# Patient Record
Sex: MALE | Race: WHITE | NOT HISPANIC OR LATINO | Employment: FULL TIME | URBAN - METROPOLITAN AREA
[De-identification: names, ages, dates, MRNs, and addresses within clinical notes are randomized per-mention and may not be internally consistent; named-entity substitution may affect disease eponyms.]

---

## 2024-06-27 ENCOUNTER — HOSPITAL ENCOUNTER (EMERGENCY)
Facility: HOSPITAL | Age: 54
Discharge: HOME | End: 2024-06-27
Attending: EMERGENCY MEDICINE
Payer: COMMERCIAL

## 2024-06-27 VITALS
DIASTOLIC BLOOD PRESSURE: 64 MMHG | RESPIRATION RATE: 18 BRPM | TEMPERATURE: 96.4 F | HEIGHT: 69 IN | HEART RATE: 68 BPM | BODY MASS INDEX: 27.4 KG/M2 | OXYGEN SATURATION: 99 % | SYSTOLIC BLOOD PRESSURE: 112 MMHG | WEIGHT: 185 LBS

## 2024-06-27 DIAGNOSIS — S61.412A LACERATION OF LEFT HAND WITHOUT FOREIGN BODY, INITIAL ENCOUNTER: Primary | ICD-10-CM

## 2024-06-27 PROCEDURE — 2500000005 HC RX 250 GENERAL PHARMACY W/O HCPCS: Performed by: EMERGENCY MEDICINE

## 2024-06-27 PROCEDURE — 12001 RPR S/N/AX/GEN/TRNK 2.5CM/<: CPT | Performed by: EMERGENCY MEDICINE

## 2024-06-27 PROCEDURE — 99283 EMERGENCY DEPT VISIT LOW MDM: CPT

## 2024-06-27 PROCEDURE — 99284 EMERGENCY DEPT VISIT MOD MDM: CPT | Performed by: EMERGENCY MEDICINE

## 2024-06-27 RX ORDER — CEPHALEXIN 500 MG/1
500 CAPSULE ORAL 3 TIMES DAILY
Qty: 21 CAPSULE | Refills: 0 | Status: SHIPPED | OUTPATIENT
Start: 2024-06-27 | End: 2024-07-04

## 2024-06-27 RX ORDER — LIDOCAINE HYDROCHLORIDE AND EPINEPHRINE 20; 10 MG/ML; UG/ML
5 INJECTION, SOLUTION INFILTRATION; PERINEURAL ONCE
Status: COMPLETED | OUTPATIENT
Start: 2024-06-27 | End: 2024-06-27

## 2024-06-27 RX ADMIN — LIDOCAINE HYDROCHLORIDE,EPINEPHRINE BITARTRATE 5 ML: 20; .01 INJECTION, SOLUTION INFILTRATION; PERINEURAL at 18:35

## 2024-06-27 ASSESSMENT — LIFESTYLE VARIABLES
TOTAL SCORE: 0
EVER HAD A DRINK FIRST THING IN THE MORNING TO STEADY YOUR NERVES TO GET RID OF A HANGOVER: NO
HAVE YOU EVER FELT YOU SHOULD CUT DOWN ON YOUR DRINKING: NO
HAVE PEOPLE ANNOYED YOU BY CRITICIZING YOUR DRINKING: NO
EVER FELT BAD OR GUILTY ABOUT YOUR DRINKING: NO

## 2024-06-27 ASSESSMENT — COLUMBIA-SUICIDE SEVERITY RATING SCALE - C-SSRS
2. HAVE YOU ACTUALLY HAD ANY THOUGHTS OF KILLING YOURSELF?: NO
6. HAVE YOU EVER DONE ANYTHING, STARTED TO DO ANYTHING, OR PREPARED TO DO ANYTHING TO END YOUR LIFE?: NO
1. IN THE PAST MONTH, HAVE YOU WISHED YOU WERE DEAD OR WISHED YOU COULD GO TO SLEEP AND NOT WAKE UP?: NO

## 2024-06-27 ASSESSMENT — PAIN - FUNCTIONAL ASSESSMENT
PAIN_FUNCTIONAL_ASSESSMENT: 0-10
PAIN_FUNCTIONAL_ASSESSMENT: 0-10

## 2024-06-27 ASSESSMENT — PAIN SCALES - GENERAL
PAINLEVEL_OUTOF10: 2
PAINLEVEL_OUTOF10: 6

## 2024-06-27 NOTE — DISCHARGE INSTRUCTIONS
Seek immediate medical attention if your wound develops: redness, swelling, warmth to touch, discharge or drainage, increasing pain, or any new or worsening symptoms.    Have your sutures removed in 10 to 12 days.

## 2024-06-27 NOTE — ED PROVIDER NOTES
HPI   Chief Complaint   Patient presents with    Hand Injury     Pt is on blood thinners; cut his L palm at work with a knife        This is a 54-year-old male who presents to ER with chief complaint of a laceration to his left thenar region.  He was using a razor to cut plastic and he excellently slipped and cut himself.  Tetanus was updated 2 years ago.  There is no intent for self-harm.  No other injuries or complaints.  No fevers or chills.  No chest pain or shortness of breath.  No nausea or vomiting or diarrhea.  No urinary issues.  No dizziness.  No headaches.    Past Medical History: Diabetes, DVT    Surgical History: No hand surgeries    Medications: See Nursing Notes, he is on Eliquis    Allergies: No drug allergies    Social History: No tobacco, no drugs, occasional    [facility]    Unless otherwise stated in this report the patient's positive and negative responses for review of systems for constitutional, eyes, ENT, cardiovascular, respiratory, gastrointestinal, neurological, genitourinary, musculoskeletal, and integument systems and related systems to the presenting problem are either as stated in the HPI or were not pertinent or were negative for the symptoms and/or complaints related to the presenting medical problem.      EXAM:  Vital signs reviewed  General:  Appears well  Alert  HEENT:  Head atraumatic  Eyes normal inspection  Normal ENT inspection  NECK:  Normal inspection  RESPIRATORY:  Normal breath sounds  No chest wall tenderness  No respiratory distress  CVS:  Heart rate and rhythm regular  No Murmurs  ABDOMEN/GI:  Soft  Non-tender  Bowel sounds normal  FEMALE GENITAL:  []  MALE GENITAL:  []  RECTAL:  []  BACK:  Normal inspection  EXTREMITIES:  Non-Tender  Full ROM of all digits.  Full range of motion of all digits against resistance.  No clinical tendon injury.  Normal appearance  NEURO:  Alert and oriented X 3  CN's normal as tested  Sensation normal  Motor  normal  PSYCH:  Mood normal  Affect normal  SKIN:  Color normal  1.5 cm laceration on the thenar area of the left hand.  Is on the proximal portion of it.  There is only into the subcutaneous fat.  No deep structure involvement.  No rash  Warm  Dry    Wound is cleaned and irrigated and repaired.                                  Yoly Coma Scale Score: 15                     Patient History   Past Medical History:   Diagnosis Date    Diabetes mellitus (Multi)     DVT (deep venous thrombosis) (Multi)     Hypercholesteremia     Pulmonary embolism (Multi)      History reviewed. No pertinent surgical history.  No family history on file.  Social History     Tobacco Use    Smoking status: Former     Types: Cigarettes    Smokeless tobacco: Never   Vaping Use    Vaping status: Never Used   Substance Use Topics    Alcohol use: Yes     Comment: 1-2 days weekly    Drug use: Never       Physical Exam   ED Triage Vitals [06/27/24 1738]   Temperature Heart Rate Respirations BP   35.8 °C (96.4 °F) 79 18 138/79      Pulse Ox Temp src Heart Rate Source Patient Position   100 % -- -- --      BP Location FiO2 (%)     -- --       Physical Exam    ED Course & MDM   Diagnoses as of 06/27/24 1820   Laceration of left hand without foreign body, initial encounter       Medical Decision Making  Patient is discharged home.  He is to follow-up with his doctor.  He is to return to the nearest ER for any new or worsening symptoms.  He is to have his sutures removed in 10 to 12 days.  He is discharged home with Keflex.  He is to return to the nearest ER for any new or worsening symptoms.        Procedure  Laceration Repair    Performed by: Dominic Luis DO  Authorized by: Dominic Luis DO    Consent:     Consent obtained:  Verbal    Consent given by:  Patient    Risks, benefits, and alternatives were discussed: yes      Risks discussed:  Infection, pain, poor cosmetic result and poor wound healing    Alternatives discussed:  No  treatment  Universal protocol:     Procedure explained and questions answered to patient or proxy's satisfaction: yes      Immediately prior to procedure, a time out was called: yes      Patient identity confirmed:  Verbally with patient  Anesthesia:     Anesthesia method:  Local infiltration    Local anesthetic:  Lidocaine 1% WITH epi  Laceration details:     Location:  Hand    Hand location:  L palm    Length (cm):  1.5  Pre-procedure details:     Preparation:  Patient was prepped and draped in usual sterile fashion  Exploration:     Limited defect created (wound extended): no      Wound exploration: wound explored through full range of motion and entire depth of wound visualized      Contaminated: no    Treatment:     Area cleansed with:  Povidone-iodine and saline    Amount of cleaning:  Standard    Irrigation solution:  Sterile saline    Irrigation method:  Syringe    Visualized foreign bodies/material removed: no      Debridement:  None    Layers/structures repaired:  Deep subcutaneous  Skin repair:     Repair method:  Sutures    Suture size:  3-0    Suture material:  Nylon    Suture technique:  Simple interrupted    Number of sutures:  2  Approximation:     Approximation:  Close  Repair type:     Repair type:  Simple  Post-procedure details:     Dressing:  Non-adherent dressing    Procedure completion:  Tolerated       Dominic Luis DO  06/27/24 1825